# Patient Record
Sex: FEMALE | Race: WHITE | NOT HISPANIC OR LATINO
[De-identification: names, ages, dates, MRNs, and addresses within clinical notes are randomized per-mention and may not be internally consistent; named-entity substitution may affect disease eponyms.]

---

## 2024-02-05 ENCOUNTER — APPOINTMENT (OUTPATIENT)
Dept: OBGYN | Facility: CLINIC | Age: 69
End: 2024-02-05
Payer: MEDICARE

## 2024-02-05 ENCOUNTER — OUTPATIENT (OUTPATIENT)
Dept: OUTPATIENT SERVICES | Facility: HOSPITAL | Age: 69
LOS: 1 days | End: 2024-02-05
Payer: MEDICARE

## 2024-02-05 VITALS
OXYGEN SATURATION: 97 % | TEMPERATURE: 97 F | BODY MASS INDEX: 31.83 KG/M2 | HEIGHT: 62 IN | DIASTOLIC BLOOD PRESSURE: 80 MMHG | WEIGHT: 173 LBS | SYSTOLIC BLOOD PRESSURE: 117 MMHG | RESPIRATION RATE: 18 BRPM | HEART RATE: 102 BPM

## 2024-02-05 DIAGNOSIS — N95.2 POSTMENOPAUSAL ATROPHIC VAGINITIS: ICD-10-CM

## 2024-02-05 DIAGNOSIS — N31.9 NEUROMUSCULAR DYSFUNCTION OF BLADDER, UNSPECIFIED: ICD-10-CM

## 2024-02-05 DIAGNOSIS — Z01.419 ENCOUNTER FOR GYNECOLOGICAL EXAMINATION (GENERAL) (ROUTINE) W/OUT ABNORMAL FINDINGS: ICD-10-CM

## 2024-02-05 DIAGNOSIS — Z78.0 ASYMPTOMATIC MENOPAUSAL STATE: ICD-10-CM

## 2024-02-05 DIAGNOSIS — Z87.891 PERSONAL HISTORY OF NICOTINE DEPENDENCE: ICD-10-CM

## 2024-02-05 DIAGNOSIS — Z86.79 PERSONAL HISTORY OF OTHER DISEASES OF THE CIRCULATORY SYSTEM: ICD-10-CM

## 2024-02-05 DIAGNOSIS — Z12.4 ENCOUNTER FOR SCREENING FOR MALIGNANT NEOPLASM OF CERVIX: ICD-10-CM

## 2024-02-05 DIAGNOSIS — Z00.00 ENCOUNTER FOR GENERAL ADULT MEDICAL EXAMINATION WITHOUT ABNORMAL FINDINGS: ICD-10-CM

## 2024-02-05 DIAGNOSIS — Z87.898 PERSONAL HISTORY OF OTHER SPECIFIED CONDITIONS: ICD-10-CM

## 2024-02-05 PROCEDURE — 87491 CHLMYD TRACH DNA AMP PROBE: CPT

## 2024-02-05 PROCEDURE — G0463: CPT

## 2024-02-05 PROCEDURE — 87591 N.GONORRHOEAE DNA AMP PROB: CPT

## 2024-02-05 PROCEDURE — 99204 OFFICE O/P NEW MOD 45 MIN: CPT

## 2024-02-05 PROCEDURE — 87624 HPV HI-RISK TYP POOLED RSLT: CPT

## 2024-02-05 NOTE — PLAN
[FreeTextEntry1] : Annual Gyn exam - Pap/HPV/STD sent; Annual screening mammogram referral given today; Hx/o Vaginal prolapse w/bladder dysfunction - Referral for consultation with Urogynecologist given today.   I spent a total of 50 minutes on date of the encounter reviewing patient's medical / surgical and Ob/Gyn history, evaluating, counseling and treating the patient.

## 2024-02-05 NOTE — PHYSICAL EXAM
[Chaperone Present] : A chaperone was present in the examining room during all aspects of the physical examination [Appropriately responsive] : appropriately responsive [Alert] : alert [No Acute Distress] : no acute distress [No Lymphadenopathy] : no lymphadenopathy [Soft] : soft [Non-tender] : non-tender [Non-distended] : non-distended [No HSM] : No HSM [No Lesions] : no lesions [No Mass] : no mass [Oriented x3] : oriented x3 [Examination Of The Breasts] : a normal appearance [No Masses] : no breast masses were palpable [Labia Majora] : normal [Labia Minora] : normal [Atrophy] : atrophy [Cystocele] : a cystocele [Dry Mucosa] : dry mucosa [Normal] : normal [FreeTextEntry4] : Grade 3 Cystocele with atrophic mucosa, no bleeding noted

## 2024-02-05 NOTE — HISTORY OF PRESENT ILLNESS
[postmenopausal] : postmenopausal [N] : Patient is not sexually active [Y] : Positive pregnancy history [FreeTextEntry1] : Annual Gyn exam - last pap/Gyn exam > 25 years ago   > 20 years, denies hx/o postmenopausal bleeding.   Last Mammogram > 2 years ago in [place]   -  x 3 followed by C/S x 2 - complaining of feeling vaginal bump while standing for > 5 years, with recent ~ 2 years of bladder / Urinary incontinence noted. Patient has not consulted a Urogynecologist previously due to feeling embarrassed.   , not sexually acitve > 10 years.    [Mammogramdate] : 2 yrs ago at [facility] [PapSmeardate] : 30 yrs ago [ColonoscopyDate] : no hx [LMPDate] : 19 yrs ago [PGHxTotal] : 5 [[facility] : [facility] [PGHxPremature] : 0 [PGHxAbortions] : 0 [[facility] [PGHxABInduced] : 0 [PGHxABSpont] : 0 [PGHxEctopic] : 0 [PGHxMultBirths] : 0 [Post-Menopause, No Sxs] : post-menopausal, currently without symptoms [Previously active] : previously active [Men] : men [Vaginal] : vaginal

## 2024-02-06 DIAGNOSIS — N81.10 CYSTOCELE, UNSPECIFIED: ICD-10-CM

## 2024-02-06 DIAGNOSIS — Z78.0 ASYMPTOMATIC MENOPAUSAL STATE: ICD-10-CM

## 2024-02-06 DIAGNOSIS — Z01.419 ENCOUNTER FOR GYNECOLOGICAL EXAMINATION (GENERAL) (ROUTINE) WITHOUT ABNORMAL FINDINGS: ICD-10-CM

## 2024-02-06 DIAGNOSIS — N95.2 POSTMENOPAUSAL ATROPHIC VAGINITIS: ICD-10-CM

## 2024-02-06 DIAGNOSIS — N31.9 NEUROMUSCULAR DYSFUNCTION OF BLADDER, UNSPECIFIED: ICD-10-CM

## 2024-02-06 DIAGNOSIS — Z12.4 ENCOUNTER FOR SCREENING FOR MALIGNANT NEOPLASM OF CERVIX: ICD-10-CM

## 2024-02-07 LAB
C TRACH RRNA SPEC QL NAA+PROBE: NOT DETECTED
HPV HIGH+LOW RISK DNA PNL CVX: NOT DETECTED
N GONORRHOEA RRNA SPEC QL NAA+PROBE: NOT DETECTED
SOURCE TP AMPLIFICATION: NORMAL

## 2024-02-08 LAB — CYTOLOGY CVX/VAG DOC THIN PREP: ABNORMAL

## 2024-04-11 ENCOUNTER — APPOINTMENT (OUTPATIENT)
Dept: UROGYNECOLOGY | Facility: CLINIC | Age: 69
End: 2024-04-11

## 2024-05-07 NOTE — PROCEDURE
[Straight Catheterization] : insertion of a straight catheter [Urinary Tract Infection] : a urinary tract infection [___ Fr Straight Tip] : a [unfilled] in Chinese straight tip catheter [None] : there were no complications with the catheter insertion [Clear] : clear [Culture] : culture

## 2024-05-08 ENCOUNTER — APPOINTMENT (OUTPATIENT)
Dept: UROGYNECOLOGY | Facility: CLINIC | Age: 69
End: 2024-05-08
Payer: MEDICARE

## 2024-05-08 ENCOUNTER — TRANSCRIPTION ENCOUNTER (OUTPATIENT)
Age: 69
End: 2024-05-08

## 2024-05-08 VITALS
HEIGHT: 62 IN | TEMPERATURE: 97.6 F | BODY MASS INDEX: 33.68 KG/M2 | DIASTOLIC BLOOD PRESSURE: 76 MMHG | OXYGEN SATURATION: 93 % | WEIGHT: 183 LBS | SYSTOLIC BLOOD PRESSURE: 112 MMHG | HEART RATE: 95 BPM

## 2024-05-08 DIAGNOSIS — N81.10 CYSTOCELE, UNSPECIFIED: ICD-10-CM

## 2024-05-08 PROCEDURE — 51701 INSERT BLADDER CATHETER: CPT

## 2024-05-08 PROCEDURE — 99204 OFFICE O/P NEW MOD 45 MIN: CPT | Mod: 25

## 2024-05-08 PROCEDURE — 99459 PELVIC EXAMINATION: CPT

## 2024-05-08 RX ORDER — AMLODIPINE AND ATORVASTATIN 5; 40 MG/1; MG/1
5-40 TABLET, COATED ORAL
Refills: 0 | Status: ACTIVE | COMMUNITY

## 2024-05-08 NOTE — HISTORY OF PRESENT ILLNESS
[Vaginal Wall Prolapse] : no [Unable To Restrain Bowel Movement] : no [Urinary Tract Infection] : no [Urinary Frequency] : no [Feelings Of Urinary Urgency] : no [Pain During Urination (Dysuria)] : no [Constipation Obstructed Defecation] : no [Stool Visible Blood] : no [Incomplete Emptying Of Stool] : no [Pelvic Pain] : no [Vaginal Pain] : no [Rectal Pain] : no [] : no [de-identified] : daily [FreeTextEntry3] : sometimes [FreeTextEntry5] : daily [de-identified] : a few times a day  [de-identified] : 7-10x/d [de-identified] : 0-1x/n [de-identified] : sometimes [FreeTextEntry1] : Margaret is a 70yo who presents with bothersome pelvic bulge for several years. She occasionally needs to manually reduce the bulge to urinate or defecate. She also has leakage of urine with an urge and daily urgency.   She is not sexually active.   PMH: HTN PSH: CD x2 Soc: Former cigarette smoker (1/2ppd x20y, quit >10y ago)  All: NKDA  Daily fluid intake: 1 bottle water + 12oz coffee + 14oz soda + 24oz beer.

## 2024-05-08 NOTE — DISCUSSION/SUMMARY
[FreeTextEntry1] : Images were used to review the findings of Stage 3 uterovaginal prolapse, cystocele, rectocele with the patient. Treatment options for the prolapse were discussed and included observation, pelvic floor exercises with or without physical therapy, a pessary, or surgical correction. Surgically we discussed the abdominal vs the vaginal routes. Abdominally we discussed a hysterectomy and a sacral colpopexy either via laparotomy or laparoscopically and robotically. Vaginally we discussed a vaginal hysterectomy and native tissue repair. Surgically we discussed hysteropexy as well as the use of biologics. If interested in surgery, we discussed obtaining a pelvic ultrasound, her prior cervical cancer screenings, and urodynamics. We also discussed the perioperative course and reviewed postoperative restrictions of complete pelvic rest and avoidance of strenuous exercise/heavy lifting (>10lb) for 6 weeks.  We reviewed her urinary symptoms and discussed possible etiologies including urinary tract infection and overactive bladder. Cath urine specimen was sent for urine culture. We discussed management options for overactive bladder including observation, fluid and behavioral modifications, bladder training, physical therapy and medications including anticholinergics and beta 3 agonists. Specifically, we discussed avoidance of bladder irritants such as caffeine, carbonation, artificial sweeteners, alcohol, acidic foods/drinks (citrus, tomatoes, pineapple), spicy foods, and chocolate. We also discussed drinking 1-1.5L of plain water to maintain adequate hydration as urine that is too concentrated can also be irritating to the bladder. In addition, we reviewed drinking slowly since ingesting large quantities of fluid can result in rapid distension of the bladder, which can worsen urinary symptoms.   At this time, she is considering a pessary. She adamantly does not want surgery. We discussed there are different shapes and sizes of pessaries and they need to be fitted to each individual patient. She will return for a pessary fitting appointment. In addition, she will make some of the fluid and behavioral modifications for her urinary symptoms.    [facility] handout on POP, OAB, bladder training was given to her. RTO in 1-2 weeks for pessary fitting and reevaluation of her urinary symptoms.

## 2024-05-08 NOTE — REASON FOR VISIT
[Initial Visit ___] : an initial visit for [unfilled] [Family Member] : family member [Pelvic Organ Prolapse] : pelvic organ prolapse [Urinary Urgency] : urinary urgency

## 2024-05-08 NOTE — PHYSICAL EXAM
[Chaperone Present] : A chaperone was present in the examining room during all aspects of the physical examination [Labia Majora] : were normal [Labia Minora] : were normal [Normal Appearance] : general appearance was normal [Atrophy] : atrophy [Cystocele] : a cystocele [No Bleeding] : there was no active vaginal bleeding [Exam Deferred] : was deferred [Scar] : a scar was noted [Rectocele] : a rectocele [Uterine Prolapse] : uterine prolapse [3] : 3 [Aa ____] : Aa [unfilled] [Ba ____] : Ba [unfilled] [C ____] : C [unfilled] [GH ____] : GH [unfilled] [PB ____] : PB [unfilled] [TVL ____] : TVL  [unfilled] [Ap ____] : Ap [unfilled] [Bp ____] : Bp [unfilled] [D ____] : D [unfilled] [Normal rectal exam] : was normal [Normal] : was normal [48161] : A chaperone was present during the pelvic exam. [FreeTextEntry2] : Linda [FreeTextEntry1] : General: Well, appearing. Alert and orientated. No acute distress HEENT: Normocephalic, atraumatic and extraocular muscles appear to be intact  Neck: Full range of motion, no obvious lymphadenopathy, deformities, or masses noted  Respiratory: Speaking in full sentences comfortably, normal work of breathing and no cough during visit Musculoskeletal: full range of motion  Extremities: No upper extremity edema noted Skin: no obvious rash or skin lesions Neuro: Orientated X 3, speech is fluent, normal rate Psych: Normal mood and affect [Tenderness] : ~T no ~M abdominal tenderness observed [Distended] : not distended [FreeTextEntry3] : (+) hypermobility, (-) cough stress test

## 2024-05-08 NOTE — LETTER BODY
[Dear  ___] : Dear  [unfilled], [I had the pleasure of evaluating your patient, [unfilled]. Thank you for referring Ms. [unfilled] for consultation for ___] : I had the pleasure of evaluating your patient, [unfilled]. Thank you for referring Ms. [unfilled] for consultation for [unfilled]. [Attached please find my note.] : Attached please find my note. [Thank you very much for allowing me to participate in the care of this patient. If you have any questions, please do not hesitate to contact me] : Thank you very much for allowing me to participate in the care of this patient. If you have any questions, please do not hesitate to contact me. [FreeTextEntry1] : prolapse, overactive bladder

## 2024-05-10 LAB — BACTERIA UR CULT: NORMAL

## 2024-05-22 ENCOUNTER — APPOINTMENT (OUTPATIENT)
Dept: UROGYNECOLOGY | Facility: CLINIC | Age: 69
End: 2024-05-22
Payer: MEDICARE

## 2024-05-22 VITALS
HEIGHT: 62 IN | DIASTOLIC BLOOD PRESSURE: 65 MMHG | SYSTOLIC BLOOD PRESSURE: 97 MMHG | WEIGHT: 187 LBS | TEMPERATURE: 97.8 F | OXYGEN SATURATION: 94 % | HEART RATE: 86 BPM | BODY MASS INDEX: 34.41 KG/M2

## 2024-05-22 PROCEDURE — 99213 OFFICE O/P EST LOW 20 MIN: CPT

## 2024-05-22 PROCEDURE — 99459 PELVIC EXAMINATION: CPT

## 2024-05-22 NOTE — PROCEDURE
[Good Fit] : fits well [Pessary Inserted] : inserted [FreeTextEntry1] : RS #4 [FreeTextEntry8] : [facility]are reviewed

## 2024-05-22 NOTE — HISTORY OF PRESENT ILLNESS
[Vaginal Wall Prolapse] : no [Unable To Restrain Bowel Movement] : no [Urinary Frequency] : no [Urinary Tract Infection] : no [Feelings Of Urinary Urgency] : no [Pain During Urination (Dysuria)] : no [Constipation Obstructed Defecation] : no [Stool Visible Blood] : no [Incomplete Emptying Of Stool] : no [Pelvic Pain] : no [Rectal Pain] : no [Vaginal Pain] : no [] : no [de-identified] : daily [FreeTextEntry3] : sometimes [FreeTextEntry5] : daily [de-identified] : a few times a day  [de-identified] : 7-10x/d [de-identified] : 0-1x/n [de-identified] : sometimes [FreeTextEntry1] : Margaret is a 70yo with Stage 3 uterovaginal prolapse, cystocele, rectocele, and overactive bladder. She was last seen on 5/8/24 and wants to proceed with pessary fitting. She has not yet made the fluid and behavioral modifications for her urinary symptoms.   She is not sexually active.   PMH: HTN PSH: CD x2 Soc: Former cigarette smoker (1/2ppd x20y, quit >10y ago)  All: NKDA  Daily fluid intake: 1 bottle water + 12oz coffee + 14oz soda + 24oz beer.

## 2024-05-22 NOTE — DISCUSSION/SUMMARY
[FreeTextEntry1] : Margaret is a 70yo with Stage 3 uterovaginal prolapse, cystocele, rectocele. She was fitted with a RS #4 today. We discussed the possibility of vaginal discharge developing, the pessary falling out, urinary incontinence developing/worsening, and the possibility of vaginal bleeding. I've asked her to call the office if any of the above happens. We also discussed how she can use the pessary for as long as she likes and if she does not like it, we can move forward with surgical correction of her prolapse. She is hesitant about learning self-care.   We also discussed how her overactive bladder symptoms may also improve with the pessary; however, it is not the primary treatment for her urinary frequency, urgency, leakage with an urge. We reviewed avoidance of bladder irritants such as caffeine, carbonation, artificial sweeteners, alcohol, acidic foods/drinks (citrus, tomatoes, pineapple), spicy foods, and chocolate. We also discussed drinking 1-1.5L of plain water to maintain adequate hydration as urine that is too concentrated can also be irritating to the bladder.   RTO 2 weeks for pessary teaching and review of her urinary symptoms.

## 2024-05-22 NOTE — PHYSICAL EXAM
[Chaperone Present] : A chaperone was present in the examining room during all aspects of the physical examination [Labia Majora] : were normal [Labia Minora] : were normal [Normal] : was normal [Normal Appearance] : general appearance was normal [Rectocele] : a rectocele [Cystocele] : a cystocele [Uterine Prolapse] : uterine prolapse [No Bleeding] : there was no active vaginal bleeding [20078] : A chaperone was present during the pelvic exam. [FreeTextEntry2] : Linda [FreeTextEntry1] : General: Well, appearing. Alert and orientated. No acute distress HEENT: Normocephalic, atraumatic and extraocular muscles appear to be intact  Neck: Full range of motion, no obvious lymphadenopathy, deformities, or masses noted  Respiratory: Speaking in full sentences comfortably, normal work of breathing and no cough during visit Musculoskeletal: full range of motion  Extremities: No upper extremity edema noted Skin: no obvious rash or skin lesions Neuro: Orientated X 3, speech is fluent, normal rate Psych: Normal mood and affect [Tenderness] : ~T no ~M abdominal tenderness observed [Distended] : not distended

## 2024-06-06 ENCOUNTER — APPOINTMENT (OUTPATIENT)
Dept: UROGYNECOLOGY | Facility: CLINIC | Age: 69
End: 2024-06-06
Payer: MEDICARE

## 2024-06-06 VITALS
WEIGHT: 187 LBS | SYSTOLIC BLOOD PRESSURE: 85 MMHG | BODY MASS INDEX: 34.41 KG/M2 | OXYGEN SATURATION: 93 % | HEART RATE: 97 BPM | HEIGHT: 62 IN | DIASTOLIC BLOOD PRESSURE: 58 MMHG | TEMPERATURE: 97.1 F

## 2024-06-06 DIAGNOSIS — N81.2 INCOMPLETE UTEROVAGINAL PROLAPSE: ICD-10-CM

## 2024-06-06 DIAGNOSIS — N32.81 OVERACTIVE BLADDER: ICD-10-CM

## 2024-06-06 DIAGNOSIS — N39.41 URGE INCONTINENCE: ICD-10-CM

## 2024-06-06 DIAGNOSIS — R39.15 URGENCY OF URINATION: ICD-10-CM

## 2024-06-06 DIAGNOSIS — N81.6 RECTOCELE: ICD-10-CM

## 2024-06-06 DIAGNOSIS — R35.0 FREQUENCY OF MICTURITION: ICD-10-CM

## 2024-06-06 DIAGNOSIS — N81.11 CYSTOCELE, MIDLINE: ICD-10-CM

## 2024-06-06 PROCEDURE — 99459 PELVIC EXAMINATION: CPT

## 2024-06-06 PROCEDURE — 99213 OFFICE O/P EST LOW 20 MIN: CPT

## 2024-06-06 NOTE — HISTORY OF PRESENT ILLNESS
[Vaginal Wall Prolapse] : no [Unable To Restrain Bowel Movement] : no [Urinary Tract Infection] : no [Urinary Frequency] : no [Feelings Of Urinary Urgency] : no [Pain During Urination (Dysuria)] : no [Constipation Obstructed Defecation] : no [Stool Visible Blood] : no [Incomplete Emptying Of Stool] : no [Pelvic Pain] : no [Vaginal Pain] : no [Rectal Pain] : no [] : no [de-identified] : None with pessary [FreeTextEntry3] : None with pessary [FreeTextEntry5] : None with pessary [de-identified] : occasionally [de-identified] : 7-10x/d [de-identified] : 0-1x/n [de-identified] : sometimes [FreeTextEntry1] : Margaret is a 70yo with Stage 3 uterovaginal prolapse, cystocele, rectocele, and overactive bladder. She was fitted with a RS#4 on 5/22/24. She has not yet made the fluid and behavioral modifications for her urinary symptoms.   She is not sexually active.   PMH: HTN PSH: CD x2 Soc: Former cigarette smoker (1/2ppd x20y, quit >10y ago)  All: NKDA  Daily fluid intake: 1 bottle water + 12oz coffee + 14oz soda + 24oz beer.

## 2024-06-06 NOTE — PROCEDURE
[Good Fit] : fits well [Pessary Inserted] : inserted [None] : no bleeding [Refit] : refit is not needed [Erosion] : no evidence of erosion [Erythema] : no erythema [Discharge] : no vaginal discharge [Infection] : no evidence of infection [FreeTextEntry1] : RS #4 [FreeTextEntry8] : [facility]icare reviewed

## 2024-06-06 NOTE — DISCUSSION/SUMMARY
[FreeTextEntry1] : Margaret is a 70yo with Stage 3 uterovaginal prolapse, cystocele, rectocele. She has a RS#4 pessary in place. Pessary precautions and instructions were reviewed. We discussed trying to learn self-care another time if she is interested. We also discussed how she can use the pessary for as long as she likes and if she does not like it, we can move forward with surgical correction of her prolapse.   We also discussed her overactive bladder symptoms. We reviewed avoidance of bladder irritants such as caffeine, carbonation, artificial sweeteners, alcohol, acidic foods/drinks (citrus, tomatoes, pineapple), spicy foods, and chocolate. We also discussed drinking 1-1.5L of plain water to maintain adequate hydration as urine that is too concentrated can also be irritating to the bladder. She currently drinks about 16oz of soda and 2 beers per day. We discussed her urinary symptoms are unlikely to improve if she continues to drink those bladder irritants. She is not bothered by her urinary symptoms enough to change her drinking habits.   RTO 3 months for pessary check and OAB follow up

## 2024-06-06 NOTE — PHYSICAL EXAM
[Chaperone Present] : A chaperone was present in the examining room during all aspects of the physical examination [Labia Majora] : were normal [Labia Minora] : were normal [Normal] : was normal [Normal Appearance] : general appearance was normal [Rectocele] : a rectocele [Cystocele] : a cystocele [Uterine Prolapse] : uterine prolapse [No Bleeding] : there was no active vaginal bleeding [20242] : A chaperone was present during the pelvic exam. [FreeTextEntry2] : Linda [FreeTextEntry1] : General: Well, appearing. Alert and orientated. No acute distress HEENT: Normocephalic, atraumatic and extraocular muscles appear to be intact  Neck: Full range of motion, no obvious lymphadenopathy, deformities, or masses noted  Respiratory: Speaking in full sentences comfortably, normal work of breathing and no cough during visit Musculoskeletal: full range of motion  Extremities: No upper extremity edema noted Skin: no obvious rash or skin lesions Neuro: Orientated X 3, speech is fluent, normal rate Psych: Normal mood and affect [Tenderness] : ~T no ~M abdominal tenderness observed [Distended] : not distended

## 2025-07-30 ENCOUNTER — APPOINTMENT (OUTPATIENT)
Dept: UROGYNECOLOGY | Facility: CLINIC | Age: 70
End: 2025-07-30
Payer: MEDICARE

## 2025-07-30 VITALS
HEART RATE: 94 BPM | SYSTOLIC BLOOD PRESSURE: 102 MMHG | HEIGHT: 62 IN | DIASTOLIC BLOOD PRESSURE: 69 MMHG | OXYGEN SATURATION: 94 % | TEMPERATURE: 97.7 F | WEIGHT: 193 LBS | RESPIRATION RATE: 16 BRPM | BODY MASS INDEX: 35.51 KG/M2

## 2025-07-30 DIAGNOSIS — N81.2 INCOMPLETE UTEROVAGINAL PROLAPSE: ICD-10-CM

## 2025-07-30 DIAGNOSIS — N81.11 CYSTOCELE, MIDLINE: ICD-10-CM

## 2025-07-30 PROCEDURE — 99212 OFFICE O/P EST SF 10 MIN: CPT

## 2025-07-30 PROCEDURE — 99459 PELVIC EXAMINATION: CPT
